# Patient Record
Sex: MALE | Race: OTHER | Employment: UNEMPLOYED | URBAN - METROPOLITAN AREA
[De-identification: names, ages, dates, MRNs, and addresses within clinical notes are randomized per-mention and may not be internally consistent; named-entity substitution may affect disease eponyms.]

---

## 2017-02-13 ENCOUNTER — APPOINTMENT (OUTPATIENT)
Dept: GENERAL RADIOLOGY | Age: 22
End: 2017-02-13
Attending: PHYSICIAN ASSISTANT
Payer: SELF-PAY

## 2017-02-13 ENCOUNTER — HOSPITAL ENCOUNTER (EMERGENCY)
Age: 22
Discharge: HOME | End: 2017-02-14
Attending: EMERGENCY MEDICINE | Admitting: EMERGENCY MEDICINE
Payer: SELF-PAY

## 2017-02-13 DIAGNOSIS — K59.00 CONSTIPATION, UNSPECIFIED CONSTIPATION TYPE: Primary | ICD-10-CM

## 2017-02-13 LAB
ALBUMIN SERPL BCP-MCNC: 3.9 G/DL (ref 3.5–5)
ALBUMIN/GLOB SERPL: 1 {RATIO} (ref 1.1–2.2)
ALP SERPL-CCNC: 118 U/L (ref 45–117)
ALT SERPL-CCNC: 90 U/L (ref 12–78)
ANION GAP BLD CALC-SCNC: 6 MMOL/L (ref 5–15)
AST SERPL W P-5'-P-CCNC: 42 U/L (ref 15–37)
BASOPHILS # BLD AUTO: 0 K/UL (ref 0–0.1)
BASOPHILS # BLD: 1 % (ref 0–1)
BILIRUB SERPL-MCNC: 0.5 MG/DL (ref 0.2–1)
BUN SERPL-MCNC: 9 MG/DL (ref 6–20)
BUN/CREAT SERPL: 9 (ref 12–20)
CALCIUM SERPL-MCNC: 8.6 MG/DL (ref 8.5–10.1)
CHLORIDE SERPL-SCNC: 105 MMOL/L (ref 97–108)
CO2 SERPL-SCNC: 29 MMOL/L (ref 21–32)
CREAT SERPL-MCNC: 1.02 MG/DL (ref 0.7–1.3)
D DIMER PPP FEU-MCNC: 0.17 MG/L FEU (ref 0–0.65)
EOSINOPHIL # BLD: 0.3 K/UL (ref 0–0.4)
EOSINOPHIL NFR BLD: 3 % (ref 0–7)
ERYTHROCYTE [DISTWIDTH] IN BLOOD BY AUTOMATED COUNT: 13.8 % (ref 11.5–14.5)
GLOBULIN SER CALC-MCNC: 4.1 G/DL (ref 2–4)
GLUCOSE SERPL-MCNC: 97 MG/DL (ref 65–100)
HCT VFR BLD AUTO: 47.3 % (ref 36.6–50.3)
HGB BLD-MCNC: 16 G/DL (ref 12.1–17)
LIPASE SERPL-CCNC: 113 U/L (ref 73–393)
LYMPHOCYTES # BLD AUTO: 32 % (ref 12–49)
LYMPHOCYTES # BLD: 2.9 K/UL (ref 0.8–3.5)
MCH RBC QN AUTO: 28.9 PG (ref 26–34)
MCHC RBC AUTO-ENTMCNC: 33.8 G/DL (ref 30–36.5)
MCV RBC AUTO: 85.5 FL (ref 80–99)
MONOCYTES # BLD: 0.8 K/UL (ref 0–1)
MONOCYTES NFR BLD AUTO: 10 % (ref 5–13)
NEUTS SEG # BLD: 4.8 K/UL (ref 1.8–8)
NEUTS SEG NFR BLD AUTO: 54 % (ref 32–75)
PLATELET # BLD AUTO: 350 K/UL (ref 150–400)
POTASSIUM SERPL-SCNC: 4 MMOL/L (ref 3.5–5.1)
PROT SERPL-MCNC: 8 G/DL (ref 6.4–8.2)
RBC # BLD AUTO: 5.53 M/UL (ref 4.1–5.7)
SODIUM SERPL-SCNC: 140 MMOL/L (ref 136–145)
WBC # BLD AUTO: 8.9 K/UL (ref 4.1–11.1)

## 2017-02-13 PROCEDURE — 80053 COMPREHEN METABOLIC PANEL: CPT | Performed by: PHYSICIAN ASSISTANT

## 2017-02-13 PROCEDURE — 36415 COLL VENOUS BLD VENIPUNCTURE: CPT | Performed by: PHYSICIAN ASSISTANT

## 2017-02-13 PROCEDURE — 85379 FIBRIN DEGRADATION QUANT: CPT | Performed by: PHYSICIAN ASSISTANT

## 2017-02-13 PROCEDURE — 85025 COMPLETE CBC W/AUTO DIFF WBC: CPT | Performed by: PHYSICIAN ASSISTANT

## 2017-02-13 PROCEDURE — 83690 ASSAY OF LIPASE: CPT | Performed by: PHYSICIAN ASSISTANT

## 2017-02-13 PROCEDURE — 74022 RADEX COMPL AQT ABD SERIES: CPT

## 2017-02-13 PROCEDURE — 99282 EMERGENCY DEPT VISIT SF MDM: CPT

## 2017-02-13 NOTE — LETTER
[address] 
[facility] EMERGENCY DEPT 
[address] [facility] [id number] [phone] Work/School Note Date: 2/13/2017 To Whom It May concern: 
 
Felicita Vale was seen and treated today in the emergency room by the following provider(s): 
Attending Provider: Seda Gillespie DO Physician Assistant: SMILEY Baires. Felicita Vale may return to work on 2/15/17.  
 
Sincerely, 
 
 
 
 
SMILEY Baires

## 2017-02-14 VITALS
DIASTOLIC BLOOD PRESSURE: 86 MMHG | BODY MASS INDEX: 34.55 KG/M2 | SYSTOLIC BLOOD PRESSURE: 135 MMHG | TEMPERATURE: 97.6 F | WEIGHT: 215 LBS | HEIGHT: 66 IN | RESPIRATION RATE: 16 BRPM | HEART RATE: 74 BPM | OXYGEN SATURATION: 99 %

## 2017-02-14 RX ORDER — POLYETHYLENE GLYCOL 3350 17 G/17G
17 POWDER, FOR SOLUTION ORAL DAILY
Qty: 119 G | Refills: 0 | Status: SHIPPED | OUTPATIENT
Start: 2017-02-14

## 2017-02-14 RX ORDER — DOCUSATE SODIUM 100 MG/1
100 CAPSULE, LIQUID FILLED ORAL 2 TIMES DAILY
Qty: 60 CAP | Refills: 0 | Status: SHIPPED | OUTPATIENT
Start: 2017-02-14 | End: 2017-05-15

## 2017-02-14 NOTE — ED NOTES
I have reviewed discharge instructions with the patient and parent. The patient and parent verbalized understanding. Pt confirmed understanding of need for follow up with primary care provider. Pt is not in any current distress and shows no evidence of clinical instability. Pt left ambulatory   Pt family/friends are present. Pt left with all personal belongings. Pt states they are not driving. Pt states chief complaint has improved with treatment provided    PT is alert and oriented x 4, Pt is hemodynamically/respiratorily stable. Paperwork given by provider and reviewed with patient, opportunity for questions/clarification given.

## 2017-02-14 NOTE — ED TRIAGE NOTES
Patient reports lower back and \"lung pain\" for 5 days. Also has had diarrhea for 5 days. No fever.   Denies N/V.

## 2017-02-14 NOTE — DISCHARGE INSTRUCTIONS
Estreñimiento: Instrucciones de cuidado - [ Constipation: Care Instructions ]  Instrucciones de cuidado  Tener estreñimiento significa que usted tiene dificultades para eliminar las heces (evacuaciones del intestino). Las personas eliminan heces entre 3 veces al día y [place] Roll vez cada 3 carlos. Lo que es normal para usted puede ser [employer]. El estreñimiento puede ocurrir con dolor en el recto y cólicos. El dolor podría empeorar cuando trata de eliminar las heces. A veces hay pequeñas cantidades de archana will viva en el papel higiénico o en la superficie de las heces. [place] se debe a las venas dilatadas cerca del recto (hemorroides). Algunos cambios en shipman Han Scientologist y estilo de rosario podrían ayudarle a evitar el estreñimiento continuo. Es posible que el médico además le recete medicamentos para ayudar a aflojar las heces. Algunos medicamentos pueden causar estreñimiento. [place] incluye los analgésicos (medicamentos para el dolor) y los antidepresivos. Infórmele a shipman médico sobre Angela Gio que usted musa. Es posible que shipman médico quiera cambiar un medicamento para aliviar shellie síntomas. La atención de seguimiento es jagdish parte clave de shipman tratamiento y seguridad. Asegúrese de hacer y acudir a todas las citas, y llame a shipman médico si está teniendo problemas. También es jagdish buena idea saber los resultados de los exámenes y mantener jagdish lista de los medicamentos que musa. ¿Cómo puede cuidarse en el hogar? · Marissa abundantes líquidos, los suficientes lima para que shipman orina sea de color amarillo larry o transparente lima el agua. Si tiene [employer], del corazón o del hígado y tiene que [place]'s líquidos, hable con shipman médico antes de aumentar shipman consumo. · Incluya en shipman dieta diaria alimentos ricos en fibra. Estos incluyen frutas, verduras, frijoles (habichuelas) y granos integrales. · Hannah por lo menos 30 minutos de ejercicio la mayoría de los días de la [place]. Caminar es jagdish buena opción. Es posible que también quiera hacer otras actividades, lima correr, nadar, [employer], o jugar al tenis u otros deportes de equipo. · Glenn un suplemento de [place], lima Citrucel o Metamucil, todos los [address] Michelle y siga todas las indicaciones de la [place]. · Programe tiempo todos los días para evacuar el intestino. Randy Ar scott diaria podría ayudar. Tómese shipman tiempo para evacuar el intestino. · Apoye los pies sobre un banco o taburete pequeño cuando se siente en el inodoro. [place] ayuda a flexionar las caderas y coloca la pelvis en posición de cuclillas. · Shipman médico podría recomendarle un laxante de venta aleksandra para aliviar el estreñimiento. Philippe Folk son [place] Asters de Magnesia (Milk of Magnesia) y [place]. Michelle y siga todas las instrucciones de la [place]. No use laxantes de [employer]. ¿Cuándo debe pedir ayuda? Llame a shipman médico ahora mismo o busque atención médica inmediata si:  · Tiene dolor abdominal nuevo o peor. · Tiene náuseas o vómito nuevos o peores. · Tiene archana en las heces. Preste especial atención a los cambios en shipman mo y asegúrese de comunicarse con shipman médico si:  · Shipman estreñimiento empeora. · No mejora lima se esperaba. ¿Dónde puede encontrar más información en inglés? Maricel Bhavana a http://rufino-china.info/. Escriba P343 en la búsqueda para aprender Jeneane Sofia de \"Estreñimiento: Instrucciones de cuidado - [ Constipation: Care Instructions ]. \"  Revisado: 27 snider, 2016  Versión del contenido: 11.1  © 9865-0557 Healthwise, Incorporated. Las instrucciones de cuidado fueron adaptadas bajo licencia por [employer] (which disclaims liability or warranty for this information). Si usted tiene [place] afección médica o sobre estas instrucciones, siempre pregunte a shipman profesional de mo. Healthwise, Incorporated niega toda garantía o responsabilidad por shipman uso de esta información.          We hope that we have addressed all of your medical concerns. The examination and treatment you received in the Emergency Department were for an emergent problem and were not intended as complete care. It is important that you follow up with your healthcare provider(s) for ongoing care. If your symptoms worsen or do not improve as expected, and you are unable to reach your usual health care provider(s), you should return to the Emergency Department. Today's healthcare is undergoing tremendous change, and patient satisfaction surveys are one of the many tools to assess the quality of medical care. You may receive a survey from the [employer] [facility] regarding your experience in the Emergency Department. I hope that your experience has been completely positive, particularly the medical care that I provided. As such, please participate in the survey; anything less than excellent does not meet my expectations or intentions. [facility] and [employer] participate in nationally recognized quality of care measures. If your blood pressure is greater than 120/80, as reported below, we urge that you seek medical care to address the potential of high blood pressure, commonly known as hypertension. Hypertension can be hereditary or can be caused by certain medical conditions, pain, stress, or \"white coat syndrome. \"       Please make an appointment with your health care provider(s) for follow up of your Emergency Department visit. VITALS:   Patient Vitals for the past 8 hrs:   Temp Pulse Resp BP SpO2   02/13/17 2115 97.6 °F (36.4 °C) 75 16 142/86 99 %          Thank you for allowing us to provide you with medical care today. We realize that you have many choices for your emergency care needs. Please choose us in the future for any continued health care needs. Georgi Vargas, [address]: [phone]            Recent Results (from the past 24 hour(s))   CBC WITH AUTOMATED DIFF    Collection Time: 02/13/17 11:13 PM   Result Value Ref Range    WBC 8.9 4.1 - 11.1 K/uL    RBC 5.53 4.10 - 5.70 M/uL    HGB 16.0 12.1 - 17.0 g/dL    HCT 47.3 36.6 - 50.3 %    MCV 85.5 80.0 - 99.0 FL    MCH 28.9 26.0 - 34.0 PG    MCHC 33.8 30.0 - 36.5 g/dL    RDW 13.8 11.5 - 14.5 %    PLATELET 156 154 - 990 K/uL    NEUTROPHILS 54 32 - 75 %    LYMPHOCYTES 32 12 - 49 %    MONOCYTES 10 5 - 13 %    EOSINOPHILS 3 0 - 7 %    BASOPHILS 1 0 - 1 %    ABS. NEUTROPHILS 4.8 1.8 - 8.0 K/UL    ABS. LYMPHOCYTES 2.9 0.8 - 3.5 K/UL    ABS. MONOCYTES 0.8 0.0 - 1.0 K/UL    ABS. EOSINOPHILS 0.3 0.0 - 0.4 K/UL    ABS. BASOPHILS 0.0 0.0 - 0.1 K/UL   METABOLIC PANEL, COMPREHENSIVE    Collection Time: 02/13/17 11:13 PM   Result Value Ref Range    Sodium 140 136 - 145 mmol/L    Potassium 4.0 3.5 - 5.1 mmol/L    Chloride 105 97 - 108 mmol/L    CO2 29 21 - 32 mmol/L    Anion gap 6 5 - 15 mmol/L    Glucose 97 65 - 100 mg/dL    BUN 9 6 - 20 MG/DL    Creatinine 1.02 0.70 - 1.30 MG/DL    BUN/Creatinine ratio 9 (L) 12 - 20      GFR est AA >60 >60 ml/min/1.73m2    GFR est non-AA >60 >60 ml/min/1.73m2    Calcium 8.6 8.5 - 10.1 MG/DL    Bilirubin, total 0.5 0.2 - 1.0 MG/DL    ALT (SGPT) 90 (H) 12 - 78 U/L    AST (SGOT) 42 (H) 15 - 37 U/L    Alk. phosphatase 118 (H) 45 - 117 U/L    Protein, total 8.0 6.4 - 8.2 g/dL    Albumin 3.9 3.5 - 5.0 g/dL    Globulin 4.1 (H) 2.0 - 4.0 g/dL    A-G Ratio 1.0 (L) 1.1 - 2.2     LIPASE    Collection Time: 02/13/17 11:13 PM   Result Value Ref Range    Lipase 113 73 - 393 U/L   D DIMER    Collection Time: 02/13/17 11:13 PM   Result Value Ref Range    D-dimer 0.17 0.00 - 0.65 mg/L FEU       Xr Abd Acute W 1 V Chest    Result Date: 2/13/2017  EXAM: Supine and upright views of the abdomen and a frontal view of the chest are provided. INDICATION:  Generalized abdominal pain and diarrhea for 5 days. The bowel gas pattern is atypical but nonobstructive in appearance.  There is mild diffuse colorectal stool. There is no free intraperitoneal air. There is no evident organomegaly or significant intraabdominal calcification. The lungs are free of acute disease and significant findings. Heart size is normal and there is no overt pulmonary edema. IMPRESSION:  Atypical but nonspecific bowel gas pattern. No acute cardiopulmonary disease.

## 2017-02-14 NOTE — ED PROVIDER NOTES
HPI Comments: Patient reports lower back and \"lung pain\" for 5 days. Also has had diarrhea for 5 days. No fever. Denies N/V. Patient denies shortness of breath or chest pain. Patient is a 22 y.o. male presenting with back pain and diarrhea. The history is provided by the patient. Back Pain    This is a new problem. The current episode started more than 2 days ago. The problem has not changed since onset. The problem occurs constantly. Patient reports not work related injury. The pain is associated with no known injury. The pain is present in the lower back. The quality of the pain is described as aching. The pain does not radiate. The pain is at a severity of 5/10. The pain is moderate. The pain is the same all the time. Associated symptoms include abdominal pain. Pertinent negatives include no chest pain, no fever, no numbness, no weight loss, no headaches, no abdominal swelling, no bowel incontinence, no perianal numbness, no bladder incontinence, no dysuria, no pelvic pain, no leg pain, no paresthesias, no paresis, no tingling and no weakness. He has tried nothing for the symptoms. The patient's surgical history non-contributory   Diarrhea    Associated symptoms include diarrhea and back pain. Pertinent negatives include no fever, no dysuria, no headaches and no chest pain. The patient's surgical history non-contributory. History reviewed. No pertinent past medical history. History reviewed. No pertinent past surgical history. History reviewed. No pertinent family history. Social History     Social History    Marital status: SINGLE     Spouse name: N/A    Number of children: N/A    Years of education: N/A     Occupational History    Not on file.      Social History Main Topics    Smoking status: Never Smoker    Smokeless tobacco: Not on file    Alcohol use No    Drug use: Not on file    Sexual activity: Not on file     Other Topics Concern    Not on file     Social History Narrative  No narrative on file         ALLERGIES: Review of patient's allergies indicates no known allergies. Review of Systems   Constitutional: Negative. Negative for fever and weight loss. HENT: Negative. Eyes: Negative. Respiratory: Negative. Cardiovascular: Negative. Negative for chest pain. Gastrointestinal: Positive for abdominal pain and diarrhea. Negative for bowel incontinence. Endocrine: Negative. Genitourinary: Negative. Negative for bladder incontinence, dysuria and pelvic pain. Musculoskeletal: Positive for back pain. Skin: Negative. Allergic/Immunologic: Negative. Neurological: Negative. Negative for tingling, weakness, numbness, headaches and paresthesias. Hematological: Negative. Psychiatric/Behavioral: Negative. All other systems reviewed and are negative. Vitals:    02/13/17 2115 02/14/17 0009   BP: 142/86 135/86   Pulse: 75 74   Resp: 16 16   Temp: 97.6 °F (36.4 °C) 97.6 °F (36.4 °C)   SpO2: 99% 99%   Weight: 97.5 kg (215 lb)    Height: 5' 6\" (1.676 m)             Physical Exam   Constitutional: He is oriented to person, place, and time. He appears well-developed and well-nourished. Non-toxic appearance. He does not have a sickly appearance. He does not appear ill. No distress. Patient is well appearing and in no acute distress. Patient is non-toxic appearing. HENT:   Head: Normocephalic and atraumatic. Right Ear: External ear normal.   Left Ear: External ear normal.   Mouth/Throat: Oropharynx is clear and moist. No oropharyngeal exudate. Eyes: Conjunctivae and EOM are normal. Pupils are equal, round, and reactive to light. Right eye exhibits no discharge. Left eye exhibits no discharge. No scleral icterus. Neck: Normal range of motion. Neck supple. No tracheal deviation present. No thyromegaly present. Cardiovascular: Normal rate, regular rhythm, normal heart sounds and intact distal pulses. No murmur heard.   Pulmonary/Chest: Effort normal and breath sounds normal. No respiratory distress. He has no wheezes. He has no rales. Abdominal: Soft. Normal appearance and bowel sounds are normal. He exhibits no distension. There is no tenderness. There is no rebound and no guarding. Musculoskeletal: Normal range of motion. He exhibits no edema or tenderness. Lymphadenopathy:     He has no cervical adenopathy. Neurological: He is alert and oriented to person, place, and time. No cranial nerve deficit. Coordination normal.   Skin: Skin is warm. No rash noted. No erythema. Psychiatric: He has a normal mood and affect. His behavior is normal. Judgment and thought content normal.   Nursing note and vitals reviewed. MDM  Number of Diagnoses or Management Options  Constipation, unspecified constipation type:   Diagnosis management comments: Assesment/Plan- xray showing constipation. Negative labs in ED. Discharge with PCP follow up.     ED Course       Procedures